# Patient Record
Sex: FEMALE | Race: WHITE | Employment: FULL TIME | URBAN - METROPOLITAN AREA
[De-identification: names, ages, dates, MRNs, and addresses within clinical notes are randomized per-mention and may not be internally consistent; named-entity substitution may affect disease eponyms.]

---

## 2024-02-17 ENCOUNTER — HOSPITAL ENCOUNTER (EMERGENCY)
Age: 39
Discharge: HOME | End: 2024-02-17
Attending: EMERGENCY MEDICINE
Payer: COMMERCIAL

## 2024-02-17 ENCOUNTER — APPOINTMENT (OUTPATIENT)
Dept: GENERAL RADIOLOGY | Age: 39
End: 2024-02-17
Payer: COMMERCIAL

## 2024-02-17 VITALS
BODY MASS INDEX: 39.42 KG/M2 | HEART RATE: 85 BPM | WEIGHT: 245.3 LBS | SYSTOLIC BLOOD PRESSURE: 167 MMHG | RESPIRATION RATE: 15 BRPM | DIASTOLIC BLOOD PRESSURE: 99 MMHG | HEIGHT: 66 IN | OXYGEN SATURATION: 100 % | TEMPERATURE: 98.2 F

## 2024-02-17 DIAGNOSIS — S82.832A TRAUMATIC CLOSED NONDISPLACED FRACTURE OF DISTAL FIBULA, LEFT, INITIAL ENCOUNTER: Primary | ICD-10-CM

## 2024-02-17 PROCEDURE — 99283 EMERGENCY DEPT VISIT LOW MDM: CPT

## 2024-02-17 PROCEDURE — 6370000000 HC RX 637 (ALT 250 FOR IP): Performed by: EMERGENCY MEDICINE

## 2024-02-17 PROCEDURE — 73610 X-RAY EXAM OF ANKLE: CPT

## 2024-02-17 RX ORDER — HYDROCODONE BITARTRATE AND ACETAMINOPHEN 5; 325 MG/1; MG/1
1 TABLET ORAL ONCE
Status: COMPLETED | OUTPATIENT
Start: 2024-02-17 | End: 2024-02-17

## 2024-02-17 RX ADMIN — HYDROCODONE BITARTRATE AND ACETAMINOPHEN 1 TABLET: 5; 325 TABLET ORAL at 16:42

## 2024-02-17 ASSESSMENT — PAIN - FUNCTIONAL ASSESSMENT: PAIN_FUNCTIONAL_ASSESSMENT: 0-10

## 2024-02-17 ASSESSMENT — PAIN SCALES - GENERAL
PAINLEVEL_OUTOF10: 5
PAINLEVEL_OUTOF10: 6

## 2024-02-17 ASSESSMENT — PAIN DESCRIPTION - DESCRIPTORS: DESCRIPTORS: ACHING

## 2024-02-17 ASSESSMENT — PAIN DESCRIPTION - FREQUENCY: FREQUENCY: CONTINUOUS

## 2024-02-17 ASSESSMENT — PAIN DESCRIPTION - ORIENTATION: ORIENTATION: LEFT

## 2024-02-17 NOTE — DISCHARGE INSTR - COC
Date of Last BM: ***  No intake or output data in the 24 hours ending 24 1811  No intake/output data recorded.    Safety Concerns:     { [facility] Safety Concerns:838240958}    Impairments/Disabilities:      { NEGRO Impairments/Disabilities:315327269}    Nutrition Therapy:  Current Nutrition Therapy:   { [facility]GRO Diet List:305102665}    Routes of Feeding: {[facility] DME Other Feedings:164330610}  Liquids: {Slp liquid thickness:51631}  Daily Fluid Restriction: {[facility] DME Yes amt example:612963567}  Last Modified Barium Swallow with Video (Video Swallowing Test): {Done Not Done Date:}    Treatments at the Time of Hospital Discharge:   Respiratory Treatments: ***  Oxygen Therapy:  {Therapy; copd oxygen:76187}  Ventilator:    { CC Vent List:583817007}    Rehab Therapies: {THERAPEUTIC INTERVENTION:0462347326}  Weight Bearing Status/Restrictions: {[facility] Weight Bearin}  Other Medical Equipment (for information only, NOT a DME order):  {EQUIPMENT:204234919}  Other Treatments: ***    Patient's personal belongings (please select all that are sent with patient):  {[facility] DME Belongings:779428350}    RN SIGNATURE:  {Esignature:143246728}    CASE MANAGEMENT/SOCIAL WORK SECTION    Inpatient Status Date: ***    Readmission Risk Assessment Score:  Readmission Risk              Risk of Unplanned Readmission:  0           Discharging to Facility/ Agency   Name:   Address:  Phone:  Fax:    Dialysis Facility (if applicable)   Name:  Address:  Dialysis Schedule:  Phone:  Fax:    / signature: {Esignature:814234811}    PHYSICIAN SECTION    Prognosis: {Prognosis:0975632385}    Condition at Discharge: { Patient Condition:972662602}    Rehab Potential (if transferring to Rehab): {Prognosis:7510333651}    Recommended Labs or Other Treatments After Discharge: ***    Physician Certification: I certify the above information and transfer of Brittny Saxena  is necessary for the continuing treatment of the  diagnosis listed and that she requires {Admit to Appropriate Level of Care:53354} for {GREATER/LESS:371074451} 30 days.     Update Admission H&P: {[facility] DME Changes in HandP:631140214}    PHYSICIAN SIGNATURE:  {Esignature:130991448}

## 2024-02-17 NOTE — ED PROVIDER NOTES
Emergency Department Encounter  Location: [facility]    Patient: Brittny Saxena  MRN: [id number]  : 1985  Date of evaluation: 2024  ED Provider: Vivienne Herron DO    Chief Complaint:    Fall and Ankle Pain (THE PATIENT REPORTS OF FALLING ON THE ICE AND HAS LEFT ANKLE PAIN. )    [place]:  Brittny Saxena is a 38 y.o. female that presents to the emergency department ***      Past Medical History:   Diagnosis Date    Anemia     Anxiety      History reviewed. No pertinent surgical history.  History reviewed. No pertinent family history.  Social History     Socioeconomic History    Marital status: Not on file     Spouse name: Not on file    Number of children: Not on file    Years of education: Not on file    Highest education level: Not on file   Occupational History    Not on file   Tobacco Use    Smoking status: Never    Smokeless tobacco: Never   Substance and Sexual Activity    Alcohol use: Not Currently    Drug use: Never    Sexual activity: Not on file   Other Topics Concern    Not on file   Social History Narrative    Not on file     Social Determinants of Health     Financial Resource Strain: Not on file   Food Insecurity: Not on file   Transportation Needs: Not on file   Physical Activity: Not on file   Stress: Not on file   Social Connections: Not on file   Intimate Partner Violence: Not on file   Housing Stability: Not on file     Current Facility-Administered Medications   Medication Dose Route Frequency Provider Last Rate Last Admin    HYDROcodone-acetaminophen (NORCO) 5-325 MG per tablet 1 tablet  1 tablet Oral Once Vivienne Herron, DO         No current outpatient medications on file.     No Known Allergies    Nursing Notes Reviewed    Physical Exam:  ED Triage Vitals [24 1629]   Enc Vitals Group      BP (!) 167/99      Pulse 85      Respirations 15      Temp 98.2 °F (36.8 °C)      Temp Source Oral      SpO2 100 %      Weight - Scale 111.3 kg (245 lb 4.8 oz)      Height  this note may have been completed with a voice recognition program. Efforts were made to edit the dictations but occasionally words are mis-transcribed.)    Vivienne Herron, DO

## 2024-02-17 NOTE — ED NOTES
The patient presents to the er today by medics after a fall at home. The patient reports of slipping on the ice and has left ankle pain. The call light is within reach.

## 2024-02-26 ENCOUNTER — OFFICE VISIT (OUTPATIENT)
Dept: ORTHOPEDIC SURGERY | Age: 39
End: 2024-02-26
Payer: COMMERCIAL

## 2024-02-26 VITALS — HEIGHT: 66 IN | BODY MASS INDEX: 39.59 KG/M2 | HEART RATE: 78 BPM | OXYGEN SATURATION: 99 % | RESPIRATION RATE: 18 BRPM

## 2024-02-26 DIAGNOSIS — S82.65XA CLOSED NONDISPLACED FRACTURE OF LATERAL MALLEOLUS OF LEFT FIBULA, INITIAL ENCOUNTER: Primary | ICD-10-CM

## 2024-02-26 PROCEDURE — 1036F TOBACCO NON-USER: CPT | Performed by: PHYSICIAN ASSISTANT

## 2024-02-26 PROCEDURE — 99204 OFFICE O/P NEW MOD 45 MIN: CPT | Performed by: PHYSICIAN ASSISTANT

## 2024-02-26 PROCEDURE — G8417 CALC BMI ABV UP PARAM F/U: HCPCS | Performed by: PHYSICIAN ASSISTANT

## 2024-02-26 PROCEDURE — 27786 TREATMENT OF ANKLE FRACTURE: CPT | Performed by: PHYSICIAN ASSISTANT

## 2024-02-26 PROCEDURE — G8427 DOCREV CUR MEDS BY ELIG CLIN: HCPCS | Performed by: PHYSICIAN ASSISTANT

## 2024-02-26 PROCEDURE — G8484 FLU IMMUNIZE NO ADMIN: HCPCS | Performed by: PHYSICIAN ASSISTANT

## 2024-02-26 RX ORDER — IBUPROFEN 800 MG/1
800 TABLET ORAL
Qty: 90 TABLET | Refills: 0 | Status: SHIPPED | OUTPATIENT
Start: 2024-02-26

## 2024-02-26 ASSESSMENT — ENCOUNTER SYMPTOMS
RESPIRATORY NEGATIVE: 1
GASTROINTESTINAL NEGATIVE: 1
EYES NEGATIVE: 1

## 2024-02-26 NOTE — PROGRESS NOTES
[facility]  I reviewed and agree with the portions of the HPI, review of systems, vital documentation and plan performed by my staff and have added/addended where appropriate.     HPI:  Brittny Saxena is a 38 y.o. female who presents to the office today for follow-up from the ER due to an injury that she sustained to the left ankle about 9 days ago.  The patient states that she slipped on some black ice and turning the ankle causing an injury.  She did go to the hospital on 2/17/2024 and x-rays showed an oblique fracture of the lateral malleolus.  She was given a short cam boot and told to follow-up with orthopedics.  She does not feel like the short boot is working very well.  She rates her pain at a 3/10 but is tolerable with over-the-counter medications.  She does work from home at this time so she is not missing any work.  She tries to limit weightbearing on the ankle at this time.      Past Medical History:   Diagnosis Date    Anemia     Anxiety        No past surgical history on file.    No family history on file.    Social History     Socioeconomic History    Marital status: Single   Tobacco Use    Smoking status: Never    Smokeless tobacco: Never   Substance and Sexual Activity    Alcohol use: Not Currently    Drug use: Never       No current outpatient medications on file.     No current facility-administered medications for this visit.       No Known Allergies    Review of Systems:    Review of Systems   Constitutional: Negative.    HENT: Negative.     Eyes: Negative.    Respiratory: Negative.     Cardiovascular: Negative.    Gastrointestinal: Negative.    Genitourinary: Negative.    Musculoskeletal:  Positive for arthralgias and myalgias.   Skin: Negative.    Neurological: Negative.    Psychiatric/Behavioral: Negative.         Physical Exam:   LMP 02/13/2024 (Approximate)       Gait is Antalgic. The patient can bear weight on the injured extremity.     Gen/Psych:Examination

## 2024-02-26 NOTE — PATIENT INSTRUCTIONS
Tall cam boot given to patient today, wear when ambulating  Take ibuprofen as prescribed  Elevate left ankle above heart level to help with swelling  Limit weightbearing on the left leg is much as possible but when you do bear weight make sure you are in the boot.  Follow-up

## 2024-03-13 ENCOUNTER — OFFICE VISIT (OUTPATIENT)
Dept: ORTHOPEDIC SURGERY | Age: 39
End: 2024-03-13

## 2024-03-13 VITALS
OXYGEN SATURATION: 99 % | HEART RATE: 89 BPM | HEIGHT: 66 IN | BODY MASS INDEX: 39.37 KG/M2 | WEIGHT: 245 LBS | RESPIRATION RATE: 14 BRPM

## 2024-03-13 DIAGNOSIS — S82.65XA CLOSED NONDISPLACED FRACTURE OF LATERAL MALLEOLUS OF LEFT FIBULA, INITIAL ENCOUNTER: Primary | ICD-10-CM

## 2024-03-13 PROCEDURE — 99024 POSTOP FOLLOW-UP VISIT: CPT | Performed by: PHYSICIAN ASSISTANT

## 2024-03-13 ASSESSMENT — ENCOUNTER SYMPTOMS
RESPIRATORY NEGATIVE: 1
GASTROINTESTINAL NEGATIVE: 1
EYES NEGATIVE: 1

## 2024-03-13 NOTE — PATIENT INSTRUCTIONS
Start to weightbearing as tolerated  Continue wearing cam boot  In 2-3 weeks follow up to switch into a lace up ankle brace  Follow up in 3 weeks

## 2024-03-13 NOTE — PROGRESS NOTES
Patient is in the office 4 weeks out from original injury. Patient states that she has been doing slightly better. Still using crutches. Unable to walk without the cam boot on. Patient pain is still mid shin down the lateral side to the ankle. Still unable to wear socks.  
lateral aspect of the fracture suggesting early healing.  The official read and interpretation of these x-rays will be done by the the [facility]           Impression:     Diagnosis Orders   1. Closed nondisplaced fracture of lateral malleolus of left fibula, initial encounter                Plan:    Patient Instructions   Start to weightbearing as tolerated  Continue wearing cam boot  In 2-3 weeks follow up to switch into a lace up ankle brace  Follow up in 3 weeks

## 2024-04-03 ENCOUNTER — OFFICE VISIT (OUTPATIENT)
Dept: ORTHOPEDIC SURGERY | Age: 39
End: 2024-04-03

## 2024-04-03 VITALS
HEIGHT: 66 IN | OXYGEN SATURATION: 97 % | WEIGHT: 245 LBS | BODY MASS INDEX: 39.37 KG/M2 | RESPIRATION RATE: 14 BRPM | HEART RATE: 84 BPM

## 2024-04-03 DIAGNOSIS — S82.65XA CLOSED NONDISPLACED FRACTURE OF LATERAL MALLEOLUS OF LEFT FIBULA, INITIAL ENCOUNTER: Primary | ICD-10-CM

## 2024-04-03 PROCEDURE — 99024 POSTOP FOLLOW-UP VISIT: CPT | Performed by: PHYSICIAN ASSISTANT

## 2024-04-03 ASSESSMENT — ENCOUNTER SYMPTOMS
EYES NEGATIVE: 1
RESPIRATORY NEGATIVE: 1
GASTROINTESTINAL NEGATIVE: 1

## 2024-04-03 NOTE — PATIENT INSTRUCTIONS
Transition into the lace up ankle brace within the next 2 weeks.  Continue weight-bearing as tolerated.  Continue range of motion exercises as instructed.  Ice and elevate as needed.  Tylenol or Motrin for pain.  Follow up in 6 weeks.    We are committed to providing you the best care possible.  If you receive a survey after visiting one of our offices, please take time to share your experience concerning your physician office visit.  These surveys are confidential and no health information about you is shared.  We are eager to improve for you and we are counting on your feedback to help make that happen.

## 2024-04-03 NOTE — PROGRESS NOTES
[facility] and Sports Medicine  I reviewed and agree with the portions of the HPI, review of systems, vital documentation and plan performed by my staff and have added/addended where appropriate.     Previous HPI:  Brittny Saxena is a 38 y.o. female who presents to the office today for follow-up from the ER due to an injury that she sustained to the left ankle about 9 days ago.  The patient states that she slipped on some black ice and turning the ankle causing an injury.  She did go to the hospital on 2/17/2024 and x-rays showed an oblique fracture of the lateral malleolus.  She was given a short cam boot and told to follow-up with orthopedics.  She does not feel like the short boot is working very well.  She rates her pain at a 3/10 but is tolerable with over-the-counter medications.  She does work from home at this time so she is not missing any work.  She tries to limit weightbearing on the ankle at this time.    Previous HPI: Brittny Saxena 38 y.o. female that returns to the office today for follow-up on her left ankle fracture.  She has been using the cam boot and walking with crutches.  She feels like it is improving.    Current HPI: Brittny Saxena is a 38-year-old female returning to the office following up on a fracture of her left lateral malleolus.  She is doing well and not having any significant pain.  She has continued to use the cam boot.  She has not transition into the brace yet but is supposed to do that over the next week.    Past Medical History:   Diagnosis Date    Anemia     Anxiety        No past surgical history on file.    No family history on file.    Social History     Socioeconomic History    Marital status: Single     Spouse name: None    Number of children: None    Years of education: None    Highest education level: None   Tobacco Use    Smoking status: Never    Smokeless tobacco: Never   Substance and Sexual Activity    Alcohol use: Not Currently    Drug use: Never       Current

## 2024-05-22 ENCOUNTER — OFFICE VISIT (OUTPATIENT)
Dept: ORTHOPEDIC SURGERY | Age: 39
End: 2024-05-22

## 2024-05-22 VITALS
WEIGHT: 245 LBS | HEIGHT: 66 IN | BODY MASS INDEX: 39.37 KG/M2 | HEART RATE: 66 BPM | RESPIRATION RATE: 14 BRPM | OXYGEN SATURATION: 99 %

## 2024-05-22 DIAGNOSIS — S82.65XA CLOSED NONDISPLACED FRACTURE OF LATERAL MALLEOLUS OF LEFT FIBULA, INITIAL ENCOUNTER: Primary | ICD-10-CM

## 2024-05-22 PROCEDURE — 99024 POSTOP FOLLOW-UP VISIT: CPT | Performed by: PHYSICIAN ASSISTANT

## 2024-05-22 NOTE — PROGRESS NOTES
[facility]  I reviewed and agree with the portions of the HPI, review of systems, vital documentation and plan performed by my staff and have added/addended where appropriate.     Previous HPI:  Brittny Saxena is a 38 y.o. female who presents to the office today for follow-up from the ER due to an injury that she sustained to the left ankle about 9 days ago.  The patient states that she slipped on some black ice and turning the ankle causing an injury.  She did go to the hospital on 2/17/2024 and x-rays showed an oblique fracture of the lateral malleolus.  She was given a short cam boot and told to follow-up with orthopedics.  She does not feel like the short boot is working very well.  She rates her pain at a 3/10 but is tolerable with over-the-counter medications.  She does work from home at this time so she is not missing any work.  She tries to limit weightbearing on the ankle at this time.    Previous HPI: Brittny Saxena 38 y.o. female that returns to the office today for follow-up on her left ankle fracture.  She has been using the cam boot and walking with crutches.  She feels like it is improving.    Previous HPI: Brittny Saxena is a 38-year-old female returning to the office following up on a fracture of her left lateral malleolus.  She is doing well and not having any significant pain.  She has continued to use the cam boot.  She has not transition into the brace yet but is supposed to do that over the next week.    Previous HPI: Brittny Saxena 38-year-old female with a left lateral malleolus fracture following up after that fracture.  She states that she is doing well.  She is now 3 months out from the fracture.  She still gets some pain and swelling but she also gets swelling in her feet on a regular basis.    Current HPI: Brittny Saxena is a 38-year-old female that returns to the office today following up on her left lateral malleolus fracture.  She states that she is doing well.  She